# Patient Record
Sex: FEMALE | URBAN - METROPOLITAN AREA
[De-identification: names, ages, dates, MRNs, and addresses within clinical notes are randomized per-mention and may not be internally consistent; named-entity substitution may affect disease eponyms.]

---

## 2024-08-30 ENCOUNTER — OFFICE VISIT (OUTPATIENT)
Dept: URBAN - METROPOLITAN AREA CLINIC 78 | Facility: CLINIC | Age: 19
End: 2024-08-30
Payer: COMMERCIAL

## 2024-08-30 ENCOUNTER — DASHBOARD ENCOUNTERS (OUTPATIENT)
Age: 19
End: 2024-08-30

## 2024-08-30 VITALS
HEIGHT: 68 IN | HEART RATE: 82 BPM | SYSTOLIC BLOOD PRESSURE: 116 MMHG | BODY MASS INDEX: 25.25 KG/M2 | WEIGHT: 166.6 LBS | DIASTOLIC BLOOD PRESSURE: 78 MMHG | RESPIRATION RATE: 14 BRPM | TEMPERATURE: 98.1 F

## 2024-08-30 DIAGNOSIS — K58.1 IRRITABLE BOWEL SYNDROME WITH CONSTIPATION: ICD-10-CM

## 2024-08-30 DIAGNOSIS — K59.09 CHRONIC CONSTIPATION: ICD-10-CM

## 2024-08-30 PROBLEM — 440630006: Status: ACTIVE | Noted: 2024-08-30

## 2024-08-30 PROCEDURE — 99243 OFF/OP CNSLTJ NEW/EST LOW 30: CPT | Performed by: INTERNAL MEDICINE

## 2024-08-30 RX ORDER — LEVONORGESTREL AND ETHINYL ESTRADIOL 0.1-0.02MG
1 TABLET KIT ORAL ONCE A DAY
Status: ACTIVE | COMMUNITY

## 2024-08-30 RX ORDER — PAROXETINE HYDROCHLORIDE HEMIHYDRATE 10 MG/1
1 TABLET IN THE MORNING TABLET, FILM COATED ORAL ONCE A DAY
Status: ACTIVE | COMMUNITY

## 2024-08-30 NOTE — HPI-TODAY'S VISIT:
Chronic constipation, abdominal discomfort - HPC:   - Constipation for ~1 yr   - Previously: BM once daily, Piatt 1-3   - Now: No BM for 1-2 wks   - Requires laxative (Milk of Magnesia) q2wks   - Abdominal pain: cramping, uncomfortable, relieved w/ BM   - Constant bloating   - No rectal bleeding - PMHx: Constipation since age 8 - Meds: Milk of Magnesia prn, ibuprofen prn (ineffective for cramping) - SHx:    - Moved away from home 1 yr ago   - Diet: Avoids bread, heavy foods; yogurt daily, high fiber, fruits, water - FHx: No constipation in family - Allergies: Not mentioned

## 2024-08-30 NOTE — PHYSICAL EXAM CHEST:
Patient returned call.    breathing is un-labored without accessory muscle use, normal breath sounds

## 2024-08-31 LAB
ALBUMIN: 4.7
ALKALINE PHOSPHATASE: 49
ALT (SGPT): 5
AST (SGOT): 14
BASO (ABSOLUTE): 0
BASOS: 1
BILIRUBIN, TOTAL: 0.6
BUN/CREATININE RATIO: 9
BUN: 7
C-REACTIVE PROTEIN, QUANT: <1
CALCIUM: 9.4
CARBON DIOXIDE, TOTAL: 21
CHLORIDE: 104
CREATININE: 0.81
EGFR: 106
EOS (ABSOLUTE): 0.1
EOS: 1
GLOBULIN, TOTAL: 2.1
GLUCOSE: 89
HEMATOCRIT: 41.3
HEMATOLOGY COMMENTS:: (no result)
HEMOGLOBIN: 13.2
IMMATURE CELLS: (no result)
IMMATURE GRANS (ABS): 0
IMMATURE GRANULOCYTES: 0
LYMPHS (ABSOLUTE): 1.8
LYMPHS: 29
MCH: 30
MCHC: 32
MCV: 94
MONOCYTES(ABSOLUTE): 0.4
MONOCYTES: 7
NEUTROPHILS (ABSOLUTE): 3.9
NEUTROPHILS: 62
NRBC: (no result)
PLATELETS: 290
POTASSIUM: 5.2
PROTEIN, TOTAL: 6.8
RBC: 4.4
RDW: 11.8
SODIUM: 140
TSH: 1.19
WBC: 6.2

## 2024-09-26 ENCOUNTER — TELEPHONE ENCOUNTER (OUTPATIENT)
Dept: URBAN - METROPOLITAN AREA CLINIC 78 | Facility: CLINIC | Age: 19
End: 2024-09-26

## 2024-09-26 RX ORDER — LINACLOTIDE 145 UG/1
1 CAPSULE AT LEAST 30 MINUTES BEFORE THE FIRST MEAL OF THE DAY ON AN EMPTY STOMACH CAPSULE, GELATIN COATED ORAL ONCE A DAY
Qty: 90 | Refills: 3 | OUTPATIENT
Start: 2024-09-27 | End: 2025-09-22

## 2024-10-01 ENCOUNTER — TELEPHONE ENCOUNTER (OUTPATIENT)
Dept: URBAN - METROPOLITAN AREA CLINIC 78 | Facility: CLINIC | Age: 19
End: 2024-10-01

## 2024-10-01 RX ORDER — TENAPANOR HYDROCHLORIDE 53.2 MG/1
1 TABLET IMMEDIATELY BEFORE MEALS TABLET ORAL TWICE A DAY
Qty: 180 TABLET | Refills: 3 | OUTPATIENT
Start: 2024-10-01 | End: 2025-09-26